# Patient Record
Sex: MALE | Race: BLACK OR AFRICAN AMERICAN | Employment: UNEMPLOYED | URBAN - METROPOLITAN AREA
[De-identification: names, ages, dates, MRNs, and addresses within clinical notes are randomized per-mention and may not be internally consistent; named-entity substitution may affect disease eponyms.]

---

## 2018-01-26 ENCOUNTER — HOSPITAL ENCOUNTER (EMERGENCY)
Age: 53
Discharge: HOME | End: 2018-01-26
Payer: MEDICAID

## 2018-01-26 VITALS
RESPIRATION RATE: 16 BRPM | BODY MASS INDEX: 24.24 KG/M2 | WEIGHT: 142 LBS | TEMPERATURE: 98.1 F | HEIGHT: 64 IN | DIASTOLIC BLOOD PRESSURE: 84 MMHG | HEART RATE: 54 BPM | SYSTOLIC BLOOD PRESSURE: 143 MMHG | OXYGEN SATURATION: 99 %

## 2018-01-26 DIAGNOSIS — Z20.2 POSSIBLE EXPOSURE TO STD: ICD-10-CM

## 2018-01-26 DIAGNOSIS — R30.0 DYSURIA: Primary | ICD-10-CM

## 2018-01-26 LAB
BILIRUBIN URINE: NEGATIVE
BLOOD, URINE: NEGATIVE
CLARITY: CLEAR
COLOR: YELLOW
GLUCOSE URINE: NEGATIVE MG/DL
KETONES, URINE: NEGATIVE MG/DL
LEUKOCYTE ESTERASE, URINE: NEGATIVE
NITRITE, URINE: NEGATIVE
PH UA: 5.5 (ref 5–9)
PROTEIN UA: NEGATIVE MG/DL
SPECIFIC GRAVITY UA: 1.03 (ref 1–1.03)
URINE REFLEX TO CULTURE: NORMAL
UROBILINOGEN, URINE: 1 E.U./DL

## 2018-01-26 PROCEDURE — 99283 EMERGENCY DEPT VISIT LOW MDM: CPT

## 2018-01-26 PROCEDURE — 87591 N.GONORRHOEAE DNA AMP PROB: CPT

## 2018-01-26 PROCEDURE — 6370000000 HC RX 637 (ALT 250 FOR IP): Performed by: PHYSICIAN ASSISTANT

## 2018-01-26 PROCEDURE — 87491 CHLMYD TRACH DNA AMP PROBE: CPT

## 2018-01-26 PROCEDURE — 96372 THER/PROPH/DIAG INJ SC/IM: CPT

## 2018-01-26 PROCEDURE — 81003 URINALYSIS AUTO W/O SCOPE: CPT

## 2018-01-26 PROCEDURE — 6360000002 HC RX W HCPCS: Performed by: PHYSICIAN ASSISTANT

## 2018-01-26 RX ORDER — AZITHROMYCIN 250 MG/1
1000 TABLET, FILM COATED ORAL DAILY
Status: COMPLETED | OUTPATIENT
Start: 2018-01-26 | End: 2018-01-26

## 2018-01-26 RX ORDER — CEFTRIAXONE SODIUM 250 MG/1
250 INJECTION, POWDER, FOR SOLUTION INTRAMUSCULAR; INTRAVENOUS ONCE
Status: COMPLETED | OUTPATIENT
Start: 2018-01-26 | End: 2018-01-26

## 2018-01-26 RX ADMIN — AZITHROMYCIN 1000 MG: 250 TABLET, FILM COATED ORAL at 12:02

## 2018-01-26 RX ADMIN — CEFTRIAXONE SODIUM 250 MG: 250 INJECTION, POWDER, FOR SOLUTION INTRAMUSCULAR; INTRAVENOUS at 12:04

## 2018-01-26 ASSESSMENT — ENCOUNTER SYMPTOMS
VOMITING: 0
PHOTOPHOBIA: 0
BACK PAIN: 0
SHORTNESS OF BREATH: 0
ABDOMINAL PAIN: 0
SORE THROAT: 0
COUGH: 0
TROUBLE SWALLOWING: 0

## 2018-01-26 NOTE — ED PROVIDER NOTES
[address] [facility] ED  eMERGENCY dEPARTMENT eNCOUnter      Pt Name: Gabbi Wright  MRN: [id number]  [place] 1965  Date of evaluation: 1/26/2018  Provider: Colette Anderson PA-C      HISTORY OF PRESENT ILLNESS    HPI  Gabbi Wright is a 47 y.o. male, who presents for evaluation of dysuria with penile itching x 1 week. Reports protected sex with new partner one week ago, but thinks the condom may have broken during intercourse. Denies history of STD. Denies fever, chills. Denies abdominal pain. REVIEW OF SYSTEMS       Review of Systems   Constitutional: Negative for chills and fever. HENT: Negative for ear pain, sore throat and trouble swallowing. Eyes: Negative for photophobia and visual disturbance. Respiratory: Negative for cough and shortness of breath. Cardiovascular: Negative for chest pain, palpitations and leg swelling. Gastrointestinal: Negative for abdominal pain and vomiting. Genitourinary: Positive for dysuria. Negative for difficulty urinating, flank pain and hematuria. Musculoskeletal: Negative for back pain. Neurological: Negative for syncope, speech difficulty, numbness and headaches. Psychiatric/Behavioral: Negative for agitation, confusion and hallucinations. PAST MEDICAL HISTORY     Past Medical History:   Diagnosis Date    Hypertension     Seizures ([facility] [address]          SURGICAL HISTORY     History reviewed. No pertinent surgical history.       CURRENT MEDICATIONS       Previous Medications    AMLODIPINE (NORVASC) 10 MG TABLET    Take 1 tablet by mouth daily    AMLODIPINE (NORVASC) 5 MG TABLET    Take 2 tablets by mouth daily    FOLIC ACID (FOLVITE) 1 MG TABLET    Take 1 tablet by mouth daily    HYDROCODONE-ACETAMINOPHEN (VICODIN) 5-500 MG PER TABLET    Take 1-2 tablets by mouth    LEVETIRACETAM (KEPPRA) 500 MG TABLET    Take 1 tablet by mouth 2 times daily    LEVETIRACETAM (KEPPRA) 500 MG TABLET    Take 1 tablet by mouth 2 times daily    LISINOPRIL (PRINIVIL;ZESTRIL) 10 MG TABLET    Take 1 tablet by mouth daily    NICOTINE (NICODERM CQ) 21 MG/24HR    Place 1 patch onto the skin daily    VITAMIN B-12 1000 MCG TABLET    Take 1 tablet by mouth daily    VITAMIN D (CHOLECALCIFEROL) 5000 UNITS CAPS CAPSULE    Take 1 capsule by mouth daily       ALLERGIES     Review of patient's allergies indicates no known allergies. FAMILY HISTORY     History reviewed. No pertinent family history. SOCIAL HISTORY       Social History     Social History    Marital status: Single     Spouse name: N/A    Number of children: N/A    Years of education: N/A     Social History Main Topics    Smoking status: Current Every Day Smoker     Packs/day: 0.50     Years: 10.00    Smokeless tobacco: None    Alcohol use Yes    Drug use: No    Sexual activity: Not Asked     Other Topics Concern    None     Social History Narrative    None         PHYSICAL EXAM       ED Triage Vitals [01/26/18 1058]   BP Temp Temp src Pulse Resp SpO2 Height Weight   (!) 143/84 98.1 °F (36.7 °C) -- 54 16 99 % 5' 4\" (1.626 m) 142 lb (64.4 kg)       Physical Exam   Constitutional: He is oriented to person, place, and time. No distress. HENT:   Head: Normocephalic and atraumatic. Eyes: EOM are normal. Pupils are equal, round, and reactive to light. Neck: Neck supple. Cardiovascular: Normal rate and regular rhythm. Pulmonary/Chest: Breath sounds normal. No respiratory distress. Abdominal: Soft. Bowel sounds are normal. There is no tenderness. Genitourinary: Penis normal. Right testis shows no mass and no tenderness. Left testis shows no mass and no tenderness. Circumcised. No penile erythema or penile tenderness. No discharge found. Genitourinary Comments: Exam performed with Pietro Moore RN present in room. Musculoskeletal: Normal range of motion. He exhibits no edema. Neurological: He is alert and oriented to person, place, and time. Skin: Skin is warm and dry. No rash noted.    Nursing note and vitals reviewed. Procedures      MDM:   Vitals:    Vitals:    01/26/18 1058   BP: (!) 143/84   Pulse: 54   Resp: 16   Temp: 98.1 °F (36.7 °C)   SpO2: 99%   Weight: 142 lb (64.4 kg)   Height: 5' 4\" (1.626 m)       CG/Chlamydia pending. UA neg. Rocephin/Zithromax given in ED for STD prophylaxis. Standard anticipatory guidance given to patient upon discharge. I have given them a specific time frame in which to follow-up and who to follow-up with. I have also advised them that they should return to the emergency department if they get worse, or not getting better or develop any new or concerning symptoms. Patient demonstrates understanding. FINAL IMPRESSION      1. Dysuria    2. Possible exposure to STD          DISPOSITION/PLAN   DISPOSITION Decision To Discharge 01/26/2018 12:10:07 PM      PATIENT REFERRED TO:  [address] [place] [address].   [address] [place] [address]  [phone]    In 2 days        DISCHARGE MEDICATIONS:  New Prescriptions    No medications on file            Colette Anderson PA-C (electronically signed)  Emergency Physician Assistant            Ewa Batres PA-C  01/26/18 7914

## 2018-02-01 LAB
C. TRACHOMATIS DNA ,URINE: NEGATIVE
N. GONORRHOEAE DNA, URINE: NEGATIVE